# Patient Record
Sex: FEMALE | Race: BLACK OR AFRICAN AMERICAN | NOT HISPANIC OR LATINO | URBAN - METROPOLITAN AREA
[De-identification: names, ages, dates, MRNs, and addresses within clinical notes are randomized per-mention and may not be internally consistent; named-entity substitution may affect disease eponyms.]

---

## 2017-04-28 ENCOUNTER — IMPORTED ENCOUNTER (OUTPATIENT)
Dept: URBAN - METROPOLITAN AREA CLINIC 50 | Facility: CLINIC | Age: 52
End: 2017-04-28

## 2017-11-17 ENCOUNTER — IMPORTED ENCOUNTER (OUTPATIENT)
Dept: URBAN - METROPOLITAN AREA CLINIC 50 | Facility: CLINIC | Age: 52
End: 2017-11-17

## 2018-01-05 ENCOUNTER — IMPORTED ENCOUNTER (OUTPATIENT)
Dept: URBAN - METROPOLITAN AREA CLINIC 50 | Facility: CLINIC | Age: 53
End: 2018-01-05

## 2018-01-05 NOTE — PATIENT DISCUSSION
""""" ""Reassured patient that intraocular pressures (IOPs) are at target levels and other ocular findings are stable. Continue present management and/or medication(s).  Follow up as directed. """

## 2019-09-17 ENCOUNTER — IMPORTED ENCOUNTER (OUTPATIENT)
Dept: URBAN - METROPOLITAN AREA CLINIC 50 | Facility: CLINIC | Age: 54
End: 2019-09-17

## 2021-04-18 ASSESSMENT — TONOMETRY
OS_IOP_MMHG: 33
OD_IOP_MMHG: 19
OS_IOP_MMHG: 22
OD_IOP_MMHG: 18

## 2021-04-18 ASSESSMENT — VISUAL ACUITY
OD_SC: 20/30
OS_SC: 20/30
OD_CC: 20/30-
OD_OTHER: 20/200. >20/400.
OD_BAT: 20/200
OS_BAT: 20/40
OS_CC: 20/30-